# Patient Record
Sex: FEMALE | Race: BLACK OR AFRICAN AMERICAN | NOT HISPANIC OR LATINO | URBAN - METROPOLITAN AREA
[De-identification: names, ages, dates, MRNs, and addresses within clinical notes are randomized per-mention and may not be internally consistent; named-entity substitution may affect disease eponyms.]

---

## 2020-11-03 ENCOUNTER — OFFICE VISIT (OUTPATIENT)
Dept: URBAN - METROPOLITAN AREA TELEHEALTH 2 | Facility: TELEHEALTH | Age: 55
End: 2020-11-03
Payer: COMMERCIAL

## 2020-11-03 DIAGNOSIS — K21.9 GERD (GASTROESOPHAGEAL REFLUX DISEASE): ICD-10-CM

## 2020-11-03 DIAGNOSIS — K58.9 IBS (IRRITABLE BOWEL SYNDROME): ICD-10-CM

## 2020-11-03 DIAGNOSIS — K76.0 NAFLD (NONALCOHOLIC FATTY LIVER DISEASE): ICD-10-CM

## 2020-11-03 DIAGNOSIS — K63.89 INTESTINAL BACTERIAL OVERGROWTH: ICD-10-CM

## 2020-11-03 DIAGNOSIS — E66.9 ADULT-ONSET OBESITY: ICD-10-CM

## 2020-11-03 DIAGNOSIS — E11.43 DIABETIC GASTROPARESIS: ICD-10-CM

## 2020-11-03 DIAGNOSIS — Z80.0: ICD-10-CM

## 2020-11-03 DIAGNOSIS — B18.1 CHRONIC HEPATITIS B WITHOUT DELTA AGENT WITHOUT CIRRHOSIS: ICD-10-CM

## 2020-11-03 DIAGNOSIS — G70.00 MYASTHENIA GRAVIS: ICD-10-CM

## 2020-11-03 DIAGNOSIS — C55 UTERINE CANCER: ICD-10-CM

## 2020-11-03 PROBLEM — 235595009 GASTROESOPHAGEAL REFLUX DISEASE: Status: ACTIVE | Noted: 2020-11-03

## 2020-11-03 PROBLEM — 91637004 MYASTHENIA GRAVIS: Status: ACTIVE | Noted: 2020-11-03

## 2020-11-03 PROBLEM — 10743008 IRRITABLE BOWEL SYNDROME: Status: ACTIVE | Noted: 2020-11-03

## 2020-11-03 PROBLEM — 713704004 GASTROPARESIS DUE TO DIABETES MELLITUS: Status: ACTIVE | Noted: 2020-11-03

## 2020-11-03 PROBLEM — 186639003 CHRONIC VIRAL HEPATITIS B WITHOUT DELTA-AGENT: Status: ACTIVE | Noted: 2020-11-03

## 2020-11-03 PROBLEM — 371973000 UTERINE CANCER: Status: ACTIVE | Noted: 2020-11-03

## 2020-11-03 PROCEDURE — G8427 DOCREV CUR MEDS BY ELIG CLIN: HCPCS | Performed by: INTERNAL MEDICINE

## 2020-11-03 PROCEDURE — 1036F TOBACCO NON-USER: CPT | Performed by: INTERNAL MEDICINE

## 2020-11-03 PROCEDURE — 3017F COLORECTAL CA SCREEN DOC REV: CPT | Performed by: INTERNAL MEDICINE

## 2020-11-03 PROCEDURE — G8417 CALC BMI ABV UP PARAM F/U: HCPCS | Performed by: INTERNAL MEDICINE

## 2020-11-03 PROCEDURE — G9903 PT SCRN TBCO ID AS NON USER: HCPCS | Performed by: INTERNAL MEDICINE

## 2020-11-03 PROCEDURE — G8482 FLU IMMUNIZE ORDER/ADMIN: HCPCS | Performed by: INTERNAL MEDICINE

## 2020-11-03 PROCEDURE — 99214 OFFICE O/P EST MOD 30 MIN: CPT | Performed by: INTERNAL MEDICINE

## 2020-11-03 RX ORDER — ECULIZUMAB 300 MG/30ML
INJECTION, SOLUTION, CONCENTRATE INTRAVENOUS
Qty: 0 | Refills: 0 | Status: ACTIVE | COMMUNITY
Start: 1900-01-01

## 2020-11-03 RX ORDER — PREDNISONE 50 MG/1
TAKE 1 TABLET BY ORAL ROUTE EVERY OTHER DAY TABLET ORAL
Qty: 0 | Refills: 0 | Status: ACTIVE | COMMUNITY
Start: 1900-01-01

## 2020-11-03 RX ORDER — PREGABALIN 150 MG
TAKE 1 CAPSULE (150 MG) BY ORAL ROUTE 2 TIMES PER DAY CAPSULE ORAL 2
Qty: 0 | Refills: 0 | Status: ACTIVE | COMMUNITY
Start: 1900-01-01

## 2020-11-03 RX ORDER — PANTOPRAZOLE SODIUM 40 MG/1
TAKE 1 TABLET (40 MG) BY ORAL ROUTE ONCE DAILY TABLET, DELAYED RELEASE ORAL 1
Qty: 0 | Refills: 0 | Status: ACTIVE | COMMUNITY
Start: 1900-01-01

## 2020-11-03 RX ORDER — AZATHIOPRINE 50 MG/1
TABLET ORAL
Qty: 0 | Refills: 0 | Status: ACTIVE | COMMUNITY
Start: 1900-01-01

## 2020-11-03 RX ORDER — AMLODIPINE BESYLATE 5 MG/1
TABLET ORAL
Qty: 0 | Refills: 0 | Status: ACTIVE | COMMUNITY
Start: 1900-01-01

## 2020-11-03 RX ORDER — ZOLPIDEM TARTRATE 10 MG/1
TAKE 1 TABLET (10 MG) BY ORAL ROUTE ONCE DAILY AT BEDTIME TABLET, FILM COATED ORAL 1
Qty: 0 | Refills: 0 | Status: ACTIVE | COMMUNITY
Start: 1900-01-01

## 2020-11-03 RX ORDER — LUBIPROSTONE 24 UG/1
TAKE 1 CAPSULE BY ORAL ROUTE CAPSULE, GELATIN COATED ORAL 2
Qty: 0 | Refills: 0 | Status: ACTIVE | COMMUNITY
Start: 1900-01-01

## 2020-11-03 RX ORDER — VALSARTAN 160 MG/1
TAKE 1 TABLET (160 MG) BY ORAL ROUTE ONCE DAILY TABLET ORAL 1
Qty: 0 | Refills: 0 | Status: ACTIVE | COMMUNITY
Start: 1900-01-01

## 2020-11-03 NOTE — HPI-TODAY'S VISIT:
54 yo pt for GERD follow up. Has been on Pnatoprazole 40 q am and still w/ MALINI sxs. Has nocturanla regurgitation. heartburn and occasional coughing spells. Complains of sore throat and burning pain. Occasional dysphagia to solids w/o food impaction. Her constipation is partially controlled w/ OTC laxatives and Miralax. Did experience SE from Motegrity" N/V, lightheadedness. Still w/ rather persistent and frequent p-prandial epigastric fullness, distention and early satiety. PET scan 3/20: bone lesions, groundglass opacities in the lungs and severe hepatic steatosis. She has had no fever or chills. Her Mysathenia gravis is active; on palliative care and on home O2. No other complaints. Poor po intake due  to p-prandial fullness and early satiety.  No other complaints. She was unable to schedule her colonoscopy / EGD in March due to COVID-19 pandemic.

## 2020-11-17 ENCOUNTER — TELEPHONE ENCOUNTER (OUTPATIENT)
Dept: URBAN - METROPOLITAN AREA CLINIC 98 | Facility: CLINIC | Age: 55
End: 2020-11-17

## 2020-12-23 ENCOUNTER — OFFICE VISIT (OUTPATIENT)
Dept: URBAN - METROPOLITAN AREA MEDICAL CENTER 39 | Facility: MEDICAL CENTER | Age: 55
End: 2020-12-23
Payer: COMMERCIAL

## 2020-12-23 DIAGNOSIS — K21.9 ACID REFLUX: ICD-10-CM

## 2020-12-23 DIAGNOSIS — Z80.0 FAMILY HISTORY MALIGNANT NEOPLASM OF BILIARY TRACT: ICD-10-CM

## 2020-12-23 PROCEDURE — 43235 EGD DIAGNOSTIC BRUSH WASH: CPT | Performed by: INTERNAL MEDICINE

## 2020-12-23 PROCEDURE — G0105 COLORECTAL SCRN; HI RISK IND: HCPCS | Performed by: INTERNAL MEDICINE

## 2020-12-23 RX ORDER — PREDNISONE 50 MG/1
TAKE 1 TABLET BY ORAL ROUTE EVERY OTHER DAY TABLET ORAL
Qty: 0 | Refills: 0 | Status: ACTIVE | COMMUNITY
Start: 1900-01-01

## 2020-12-23 RX ORDER — PREGABALIN 150 MG
TAKE 1 CAPSULE (150 MG) BY ORAL ROUTE 2 TIMES PER DAY CAPSULE ORAL 2
Qty: 0 | Refills: 0 | Status: ACTIVE | COMMUNITY
Start: 1900-01-01

## 2020-12-23 RX ORDER — AZATHIOPRINE 50 MG/1
TABLET ORAL
Qty: 0 | Refills: 0 | Status: ACTIVE | COMMUNITY
Start: 1900-01-01

## 2020-12-23 RX ORDER — AMLODIPINE BESYLATE 5 MG/1
TABLET ORAL
Qty: 0 | Refills: 0 | Status: ACTIVE | COMMUNITY
Start: 1900-01-01

## 2020-12-23 RX ORDER — VALSARTAN 160 MG/1
TAKE 1 TABLET (160 MG) BY ORAL ROUTE ONCE DAILY TABLET ORAL 1
Qty: 0 | Refills: 0 | Status: ACTIVE | COMMUNITY
Start: 1900-01-01

## 2020-12-23 RX ORDER — PANTOPRAZOLE SODIUM 40 MG/1
TAKE 1 TABLET (40 MG) BY ORAL ROUTE ONCE DAILY TABLET, DELAYED RELEASE ORAL 1
Qty: 0 | Refills: 0 | Status: ACTIVE | COMMUNITY
Start: 1900-01-01

## 2020-12-23 RX ORDER — LUBIPROSTONE 24 UG/1
TAKE 1 CAPSULE BY ORAL ROUTE CAPSULE, GELATIN COATED ORAL 2
Qty: 0 | Refills: 0 | Status: ACTIVE | COMMUNITY
Start: 1900-01-01

## 2020-12-23 RX ORDER — ECULIZUMAB 300 MG/30ML
INJECTION, SOLUTION, CONCENTRATE INTRAVENOUS
Qty: 0 | Refills: 0 | Status: ACTIVE | COMMUNITY
Start: 1900-01-01

## 2020-12-23 RX ORDER — ZOLPIDEM TARTRATE 10 MG/1
TAKE 1 TABLET (10 MG) BY ORAL ROUTE ONCE DAILY AT BEDTIME TABLET, FILM COATED ORAL 1
Qty: 0 | Refills: 0 | Status: ACTIVE | COMMUNITY
Start: 1900-01-01

## 2021-01-27 ENCOUNTER — OFFICE VISIT (OUTPATIENT)
Dept: URBAN - METROPOLITAN AREA CLINIC 98 | Facility: CLINIC | Age: 56
End: 2021-01-27
Payer: COMMERCIAL

## 2021-01-27 DIAGNOSIS — E88.81 METABOLIC SYNDROME: ICD-10-CM

## 2021-01-27 DIAGNOSIS — R10.9 ABDOMINAL PAIN OF MULTIPLE SITES: ICD-10-CM

## 2021-01-27 DIAGNOSIS — Z80.0 FAMILY HISTORY OF COLON CANCER: ICD-10-CM

## 2021-01-27 DIAGNOSIS — E66.9 ADULT-ONSET OBESITY: ICD-10-CM

## 2021-01-27 DIAGNOSIS — Z86.69 HISTORY OF MYASTHENIA GRAVIS: ICD-10-CM

## 2021-01-27 DIAGNOSIS — K31.84 DIABETIC GASTROPARESIS: ICD-10-CM

## 2021-01-27 DIAGNOSIS — K58.9 IBS (IRRITABLE BOWEL SYNDROME)-C: ICD-10-CM

## 2021-01-27 DIAGNOSIS — B18.1 CHRONIC HEPATITIS B WITHOUT DELTA AGENT WITH HEPATIC COMA: ICD-10-CM

## 2021-01-27 DIAGNOSIS — K76.0 NAFLD (NONALCOHOLIC FATTY LIVER DISEASE): ICD-10-CM

## 2021-01-27 PROBLEM — 713704004 GASTROPARESIS DUE TO DIABETES MELLITUS: Status: ACTIVE | Noted: 2021-01-27

## 2021-01-27 PROBLEM — 237602007 METABOLIC SYNDROME: Status: ACTIVE | Noted: 2021-01-27

## 2021-01-27 PROBLEM — 10743008 IRRITABLE BOWEL SYNDROME: Status: ACTIVE | Noted: 2021-01-27

## 2021-01-27 PROBLEM — 186639003 CHRONIC VIRAL HEPATITIS B WITHOUT DELTA-AGENT: Status: ACTIVE | Noted: 2021-01-27

## 2021-01-27 PROBLEM — 197315008 NAFLD - NONALCOHOLIC FATTY LIVER DISEASE: Status: ACTIVE | Noted: 2020-11-03

## 2021-01-27 PROBLEM — 296526005 ADULT-ONSET OBESITY: Status: ACTIVE | Noted: 2020-11-03

## 2021-01-27 PROCEDURE — 3017F COLORECTAL CA SCREEN DOC REV: CPT | Performed by: INTERNAL MEDICINE

## 2021-01-27 PROCEDURE — 99214 OFFICE O/P EST MOD 30 MIN: CPT | Performed by: INTERNAL MEDICINE

## 2021-01-27 PROCEDURE — 1036F TOBACCO NON-USER: CPT | Performed by: INTERNAL MEDICINE

## 2021-01-27 PROCEDURE — G8417 CALC BMI ABV UP PARAM F/U: HCPCS | Performed by: INTERNAL MEDICINE

## 2021-01-27 PROCEDURE — G9903 PT SCRN TBCO ID AS NON USER: HCPCS | Performed by: INTERNAL MEDICINE

## 2021-01-27 PROCEDURE — G8482 FLU IMMUNIZE ORDER/ADMIN: HCPCS | Performed by: INTERNAL MEDICINE

## 2021-01-27 PROCEDURE — G8427 DOCREV CUR MEDS BY ELIG CLIN: HCPCS | Performed by: INTERNAL MEDICINE

## 2021-01-27 RX ORDER — PREDNISONE 50 MG/1
TAKE 1 TABLET BY ORAL ROUTE EVERY OTHER DAY TABLET ORAL
Qty: 0 | Refills: 0 | Status: ACTIVE | COMMUNITY
Start: 1900-01-01

## 2021-01-27 RX ORDER — AZATHIOPRINE 50 MG/1
TABLET ORAL
Qty: 0 | Refills: 0 | Status: ACTIVE | COMMUNITY
Start: 1900-01-01

## 2021-01-27 RX ORDER — PANTOPRAZOLE SODIUM 40 MG/1
TAKE 1 TABLET (40 MG) BY ORAL ROUTE ONCE DAILY TABLET, DELAYED RELEASE ORAL 1
Qty: 0 | Refills: 0 | Status: ACTIVE | COMMUNITY
Start: 1900-01-01

## 2021-01-27 RX ORDER — AMLODIPINE BESYLATE 5 MG/1
TABLET ORAL
Qty: 0 | Refills: 0 | Status: ACTIVE | COMMUNITY
Start: 1900-01-01

## 2021-01-27 RX ORDER — VALSARTAN 160 MG/1
TAKE 1 TABLET (160 MG) BY ORAL ROUTE ONCE DAILY TABLET ORAL 1
Qty: 0 | Refills: 0 | Status: ACTIVE | COMMUNITY
Start: 1900-01-01

## 2021-01-27 RX ORDER — LUBIPROSTONE 24 UG/1
TAKE 1 CAPSULE BY ORAL ROUTE CAPSULE, GELATIN COATED ORAL 2
Qty: 0 | Refills: 0 | Status: ACTIVE | COMMUNITY
Start: 1900-01-01

## 2021-01-27 RX ORDER — ECULIZUMAB 300 MG/30ML
INJECTION, SOLUTION, CONCENTRATE INTRAVENOUS
Qty: 0 | Refills: 0 | Status: ACTIVE | COMMUNITY
Start: 1900-01-01

## 2021-01-27 RX ORDER — PREGABALIN 150 MG
TAKE 1 CAPSULE (150 MG) BY ORAL ROUTE 2 TIMES PER DAY CAPSULE ORAL 2
Qty: 0 | Refills: 0 | Status: ACTIVE | COMMUNITY
Start: 1900-01-01

## 2021-01-27 RX ORDER — ZOLPIDEM TARTRATE 10 MG/1
TAKE 1 TABLET (10 MG) BY ORAL ROUTE ONCE DAILY AT BEDTIME TABLET, FILM COATED ORAL 1
Qty: 0 | Refills: 0 | Status: ACTIVE | COMMUNITY
Start: 1900-01-01

## 2021-01-27 NOTE — HPI-TODAY'S VISIT:
53 yo pt w/ hx  chronic GERD, chronic HBV on Viread, IBS-C, Myasthenia Gravis on Mestinon and Solaris IV q 2 wks, T2DM, HTN, HLD, Obesity hx CVA here for follow up. Her constipation has improved somewhat w/ Miralax / Colace and 5 small meals a day. Had a large non-bloody bm yesterday. Did experienced nausea and dizziness from Motegr[employer]. Has had no dysphagia / odynophagia. She has noted a gradual increase in her abdominal girth past couple of days, w/ diffuse abdominal discomfort w/ distention. No fever or chills. No CP and some PAUL. EGD 12/20: sm HH. Colonoscopy 12/20: poor prep and I + E Hrrds. Patient experienced hypercapneic, hypoxic respiratory failure after procedures were completed, requiring intubation and transfer pt to ICU. She was extubated the following day; had hyperglycemia requiring Insulin drip. Post extubation stridor resolved w/ racemic epi and Dexamethasone. Previous RUQ-US: fatty liver and s/p CCY. Previous labs :  and .

## 2023-12-04 ENCOUNTER — CLAIMS CREATED FROM THE CLAIM WINDOW (OUTPATIENT)
Dept: URBAN - METROPOLITAN AREA MEDICAL CENTER 11 | Facility: MEDICAL CENTER | Age: 58
End: 2023-12-04
Payer: COMMERCIAL

## 2023-12-04 DIAGNOSIS — R13.10 DYSPHAGIA, UNSPECIFIED: ICD-10-CM

## 2023-12-04 DIAGNOSIS — J96.21 ACUTE AND CHRONIC RESPIRATORY FAILURE WITH HYPOXIA: ICD-10-CM

## 2023-12-04 PROCEDURE — 99254 IP/OBS CNSLTJ NEW/EST MOD 60: CPT | Performed by: INTERNAL MEDICINE

## 2024-02-14 ENCOUNTER — OV EP (OUTPATIENT)
Dept: URBAN - METROPOLITAN AREA CLINIC 98 | Facility: CLINIC | Age: 59
End: 2024-02-14

## 2024-02-22 ENCOUNTER — OV EP (OUTPATIENT)
Dept: URBAN - METROPOLITAN AREA CLINIC 98 | Facility: CLINIC | Age: 59
End: 2024-02-22

## 2024-02-22 VITALS
HEIGHT: 65 IN | BODY MASS INDEX: 36.65 KG/M2 | SYSTOLIC BLOOD PRESSURE: 146 MMHG | HEART RATE: 93 BPM | WEIGHT: 220 LBS | TEMPERATURE: 97.7 F | DIASTOLIC BLOOD PRESSURE: 70 MMHG

## 2024-02-22 RX ORDER — LUBIPROSTONE 24 UG/1
TAKE 1 CAPSULE BY ORAL ROUTE CAPSULE, GELATIN COATED ORAL 2
Qty: 0 | Refills: 0 | Status: ACTIVE | COMMUNITY
Start: 1900-01-01

## 2024-02-22 RX ORDER — AZATHIOPRINE 50 MG/1
TABLET ORAL
Qty: 0 | Refills: 0 | Status: ACTIVE | COMMUNITY
Start: 1900-01-01

## 2024-02-22 RX ORDER — PREDNISONE 50 MG/1
TAKE 1 TABLET BY ORAL ROUTE EVERY OTHER DAY TABLET ORAL
Qty: 0 | Refills: 0 | Status: ACTIVE | COMMUNITY
Start: 1900-01-01

## 2024-02-22 RX ORDER — PANTOPRAZOLE SODIUM 40 MG/1
TAKE 1 TABLET (40 MG) BY ORAL ROUTE ONCE DAILY TABLET, DELAYED RELEASE ORAL 1
Qty: 0 | Refills: 0 | Status: ACTIVE | COMMUNITY
Start: 1900-01-01

## 2024-02-22 RX ORDER — PREGABALIN 150 MG
TAKE 1 CAPSULE (150 MG) BY ORAL ROUTE 2 TIMES PER DAY CAPSULE ORAL 2
Qty: 0 | Refills: 0 | Status: ACTIVE | COMMUNITY
Start: 1900-01-01

## 2024-02-22 RX ORDER — AMLODIPINE BESYLATE 5 MG/1
TABLET ORAL
Qty: 0 | Refills: 0 | Status: ACTIVE | COMMUNITY
Start: 1900-01-01

## 2024-02-22 RX ORDER — VALSARTAN 160 MG/1
TAKE 1 TABLET (160 MG) BY ORAL ROUTE ONCE DAILY TABLET ORAL 1
Qty: 0 | Refills: 0 | Status: ACTIVE | COMMUNITY
Start: 1900-01-01

## 2024-02-22 RX ORDER — ECULIZUMAB 300 MG/30ML
INJECTION, SOLUTION, CONCENTRATE INTRAVENOUS
Qty: 0 | Refills: 0 | Status: ACTIVE | COMMUNITY
Start: 1900-01-01

## 2024-02-22 RX ORDER — ZOLPIDEM TARTRATE 10 MG/1
TAKE 1 TABLET (10 MG) BY ORAL ROUTE ONCE DAILY AT BEDTIME TABLET, FILM COATED ORAL 1
Qty: 0 | Refills: 0 | Status: ACTIVE | COMMUNITY
Start: 1900-01-01

## 2024-02-22 NOTE — HPI-TODAY'S VISIT:
Visit 1/27/2021 53 yo pt w/ hx  chronic GERD, chronic HBV on Viread, IBS-C, Myasthenia Gravis on Mestinon and Solaris IV q 2 wks, T2DM, HTN, HLD, Obesity hx CVA here for follow up. Her constipation has improved somewhat w/ Miralax / Colace and 5 small meals a day. Had a large non-bloody bm yesterday. Did experienced nausea and dizziness from Motegrity. Has had no dysphagia / odynophagia. She has noted a gradual increase in her abdominal girth past couple of days, w/ diffuse abdominal discomfort w/ distention. No fever or chills. No CP and some PAUL. EGD 12/20: sm HH. Colonoscopy 12/20: poor prep and I + E Hrrds. Patient experienced hypercapneic, hypoxic respiratory failure after procedures were completed, requiring intubation and transfer pt to ICU. She was extubated the following day; had hyperglycemia requiring Insulin drip. Post extubation stridor resolved w/ racemic epi and Dexamethasone. Previous RUQ-US: fatty liver and s/p CCY. Previous labs :  and .  Visit 2/22/2024 - Here today to discuss nausea, vomiting, with gastroparesis - Has been in and out of the [facility] and [facility] with Myasthenia Gravis- with trach - Discharged 12/28/23 from [facility]  - Patient vomiting solid food and unable to digest anything - Last visit to neurologist- wanted patient to go back to hospital for admission for Myasthenia Gravis flare - Patient adamant that she is not going back to the hospital - Does not have a BM without using enemas - Refuses to use enemas because it "makes me bleed" - Last colonoscopy 12/23/20 > no specimens- poor colon prep recommended repeat in 1 year- Not completed

## 2024-02-23 ENCOUNTER — LAB (OUTPATIENT)
Dept: URBAN - METROPOLITAN AREA CLINIC 98 | Facility: CLINIC | Age: 59
End: 2024-02-23

## 2024-02-26 ENCOUNTER — TELPHEP (OUTPATIENT)
Dept: URBAN - METROPOLITAN AREA TELEHEALTH 2 | Facility: TELEHEALTH | Age: 59
End: 2024-02-26
Payer: COMMERCIAL

## 2024-02-26 ENCOUNTER — LAB (OUTPATIENT)
Dept: URBAN - METROPOLITAN AREA TELEHEALTH 2 | Facility: TELEHEALTH | Age: 59
End: 2024-02-26

## 2024-02-26 DIAGNOSIS — E66.8 EXTREME OBESITY: ICD-10-CM

## 2024-02-26 DIAGNOSIS — Z93.0: ICD-10-CM

## 2024-02-26 DIAGNOSIS — G70.00 MYASTHENIA GRAVIS: ICD-10-CM

## 2024-02-26 DIAGNOSIS — K31.84 DIABETIC GASTROPARESIS: ICD-10-CM

## 2024-02-26 PROBLEM — 14760008: Status: ACTIVE | Noted: 2024-02-23

## 2024-02-26 PROCEDURE — 99215 OFFICE O/P EST HI 40 MIN: CPT | Performed by: INTERNAL MEDICINE

## 2024-02-26 RX ORDER — VALSARTAN 160 MG/1
TAKE 1 TABLET (160 MG) BY ORAL ROUTE ONCE DAILY TABLET ORAL 1
Qty: 0 | Refills: 0 | Status: ACTIVE | COMMUNITY
Start: 1900-01-01

## 2024-02-26 RX ORDER — LUBIPROSTONE 24 UG/1
TAKE 1 CAPSULE BY ORAL ROUTE CAPSULE, GELATIN COATED ORAL 2
Qty: 0 | Refills: 0 | Status: ACTIVE | COMMUNITY
Start: 1900-01-01

## 2024-02-26 RX ORDER — ZOLPIDEM TARTRATE 10 MG/1
TAKE 1 TABLET (10 MG) BY ORAL ROUTE ONCE DAILY AT BEDTIME TABLET, FILM COATED ORAL 1
Qty: 0 | Refills: 0 | Status: ACTIVE | COMMUNITY
Start: 1900-01-01

## 2024-02-26 RX ORDER — PANTOPRAZOLE SODIUM 40 MG/1
TAKE 1 TABLET (40 MG) BY ORAL ROUTE ONCE DAILY TABLET, DELAYED RELEASE ORAL 1
Qty: 0 | Refills: 0 | Status: ACTIVE | COMMUNITY
Start: 1900-01-01

## 2024-02-26 RX ORDER — AZATHIOPRINE 50 MG/1
TABLET ORAL
Qty: 0 | Refills: 0 | Status: ACTIVE | COMMUNITY
Start: 1900-01-01

## 2024-02-26 RX ORDER — PREDNISONE 50 MG/1
TAKE 1 TABLET BY ORAL ROUTE EVERY OTHER DAY TABLET ORAL
Qty: 0 | Refills: 0 | Status: ACTIVE | COMMUNITY
Start: 1900-01-01

## 2024-02-26 RX ORDER — PREGABALIN 150 MG
TAKE 1 CAPSULE (150 MG) BY ORAL ROUTE 2 TIMES PER DAY CAPSULE ORAL 2
Qty: 0 | Refills: 0 | Status: ACTIVE | COMMUNITY
Start: 1900-01-01

## 2024-02-26 RX ORDER — PRUCALOPRIDE 2 MG/1
1 TABLET TABLET, FILM COATED ORAL ONCE A DAY
Qty: 30 | Refills: 3 | OUTPATIENT
Start: 2024-02-26 | End: 2024-06-25

## 2024-02-26 RX ORDER — ECULIZUMAB 300 MG/30ML
INJECTION, SOLUTION, CONCENTRATE INTRAVENOUS
Qty: 0 | Refills: 0 | Status: ACTIVE | COMMUNITY
Start: 1900-01-01

## 2024-02-26 RX ORDER — AMLODIPINE BESYLATE 5 MG/1
TABLET ORAL
Qty: 0 | Refills: 0 | Status: ACTIVE | COMMUNITY
Start: 1900-01-01